# Patient Record
Sex: MALE | Race: WHITE | NOT HISPANIC OR LATINO | Employment: FULL TIME | URBAN - NONMETROPOLITAN AREA
[De-identification: names, ages, dates, MRNs, and addresses within clinical notes are randomized per-mention and may not be internally consistent; named-entity substitution may affect disease eponyms.]

---

## 2017-01-27 ENCOUNTER — TRANSCRIBE ORDERS (OUTPATIENT)
Dept: CT IMAGING | Facility: HOSPITAL | Age: 42
End: 2017-01-27

## 2017-01-27 DIAGNOSIS — K85.90 PANCREATITIS, RECURRENT: Primary | ICD-10-CM

## 2017-01-31 ENCOUNTER — HOSPITAL ENCOUNTER (OUTPATIENT)
Dept: CT IMAGING | Facility: HOSPITAL | Age: 42
Discharge: HOME | End: 2017-01-31
Admitting: FAMILY MEDICINE

## 2017-01-31 DIAGNOSIS — K85.90 PANCREATITIS, RECURRENT: ICD-10-CM

## 2017-01-31 PROCEDURE — 25510000001 DIATRIZOATE MEGLUMINE & SODIUM PER 1 ML: Performed by: FAMILY MEDICINE

## 2017-01-31 PROCEDURE — 74178 CT ABD&PLV WO CNTR FLWD CNTR: CPT

## 2017-01-31 PROCEDURE — 0 IOPAMIDOL 61 % SOLUTION: Performed by: FAMILY MEDICINE

## 2017-01-31 RX ADMIN — DIATRIZOATE MEGLUMINE AND DIATRIZOATE SODIUM 30 ML: 660; 100 LIQUID ORAL; RECTAL at 16:15

## 2017-01-31 RX ADMIN — IOPAMIDOL 98 ML: 612 INJECTION, SOLUTION INTRAVENOUS at 16:15

## 2019-02-11 ENCOUNTER — OFFICE VISIT (OUTPATIENT)
Dept: ORTHOPEDIC SURGERY | Facility: CLINIC | Age: 44
End: 2019-02-11

## 2019-02-11 VITALS — WEIGHT: 265 LBS | BODY MASS INDEX: 35.89 KG/M2 | RESPIRATION RATE: 18 BRPM | HEIGHT: 72 IN

## 2019-02-11 DIAGNOSIS — S93.492A SPRAIN OF ANTERIOR TALOFIBULAR LIGAMENT OF LEFT ANKLE, INITIAL ENCOUNTER: Primary | ICD-10-CM

## 2019-02-11 DIAGNOSIS — M20.11 HALLUX VALGUS OF RIGHT FOOT: ICD-10-CM

## 2019-02-11 DIAGNOSIS — G57.32 ENTRAPMENT NEUROPATHY OF LEFT SUPERFICIAL PERONEAL NERVE: ICD-10-CM

## 2019-02-11 PROCEDURE — 99203 OFFICE O/P NEW LOW 30 MIN: CPT | Performed by: PODIATRIST

## 2019-02-11 RX ORDER — LOSARTAN POTASSIUM AND HYDROCHLOROTHIAZIDE 25; 100 MG/1; MG/1
1 TABLET ORAL DAILY
COMMUNITY

## 2019-02-11 RX ORDER — MELOXICAM 7.5 MG/1
7.5 TABLET ORAL DAILY
Qty: 30 TABLET | Refills: 0 | Status: SHIPPED | OUTPATIENT
Start: 2019-02-11

## 2019-02-11 NOTE — PROGRESS NOTES
Subjective   Patient ID: Vignesh Cheema is a 43 y.o. male   Pain and Numbness of the Left Foot (NKI)  Comes in today complaining of the left foot and ankle pain and stiffness as well as numbness and tingling.  He denies any recent injury states he did play soccer in his youth and had multiple injuries and ankle sprains back in.  Says the pain is intermittent but it's becoming more bothersome and frequent.  Says over the last few weeks it's gotten worse.  He also complains of a bunion on his right foot.    History of Present Illness    Pain Score: 5  Pain Location: Foot  Pain Orientation: Left     Pain Descriptors: Cramping, Sharp, Shooting  Pain Frequency: Intermittent  Pain Onset: Ongoing  Date Pain First Started: 01/11/19  Clinical Progression: Not changed           Pain Intervention(s): Rest, Home medication  Result of Injury: No       Review of Systems   Constitutional: Negative.    Respiratory: Negative.    Cardiovascular: Negative.    Gastrointestinal: Negative.    Musculoskeletal: Positive for arthralgias (left foot) and joint swelling.   Skin: Negative.        Past Medical History:   Diagnosis Date   • Hypertension         Past Surgical History:   Procedure Laterality Date   • ORIF ULNA/RADIUS FRACTURES Right 2000       No Known Allergies      Current Outpatient Medications:   •  losartan-hydrochlorothiazide (HYZAAR) 100-25 MG per tablet, Take 1 tablet by mouth Daily., Disp: , Rfl:   •  meloxicam (MOBIC) 7.5 MG tablet, Take 1 tablet by mouth Daily., Disp: 30 tablet, Rfl: 0    Family History   Problem Relation Age of Onset   • Cancer Mother        Social History     Socioeconomic History   • Marital status:      Spouse name: Not on file   • Number of children: Not on file   • Years of education: Not on file   • Highest education level: Not on file   Social Needs   • Financial resource strain: Not on file   • Food insecurity - worry: Not on file   • Food insecurity - inability: Not on file   •  Transportation needs - medical: Not on file   • Transportation needs - non-medical: Not on file   Occupational History   • Not on file   Tobacco Use   • Smoking status: Never Smoker   • Smokeless tobacco: Never Used   Substance and Sexual Activity   • Alcohol use: Yes     Frequency: Never     Comment: socially   • Drug use: No   • Sexual activity: Defer   Other Topics Concern   • Not on file   Social History Narrative   • Not on file       Counseling given: No       I have reviewed all of the above social hx, family hx, surgical hx, medications, allergies & ROS and confirm that it is accurate.  Objective   Physical Exam   Constitutional: He is oriented to person, place, and time. He appears well-developed and well-nourished.   HENT:   Head: Normocephalic and atraumatic.   Nose: Nose normal.   Eyes: Conjunctivae and EOM are normal. Pupils are equal, round, and reactive to light.   Neck: Normal range of motion.   Cardiovascular: Normal rate.   Pulmonary/Chest: Effort normal.   Neurological: He is alert and oriented to person, place, and time.   Skin: Skin is warm.   Psychiatric: He has a normal mood and affect. His behavior is normal. Judgment and thought content normal.   Nursing note and vitals reviewed.    Left Ankle Exam     Tenderness   The patient is experiencing tenderness in the ATF and CF.   Swelling: moderate    Range of Motion   Dorsiflexion: normal   Plantar flexion: normal   Eversion: normal   Inversion:  15 abnormal     Muscle Strength   The patient has normal left ankle strength.    Tests   Anterior drawer: positive  Varus tilt: positive    Other   Sensation: normal  Pulse: present          Lower extremity exam:  Vascular: Pulses palpable, pedal hair noted, CFT brisk, no edema noted  Neuro: Gross sensation intact.  He complains of paresthesias and numbness and tingling with positive Tinel's over the anterior ankle joint to the lateral 4 digits over the superficial peroneal nerve area.  Negative Tinel's  to the deep peroneal nerve.  Derm: Normal turgor and temperature, no wounds or sores or lesions  MSK: Ankle joint range of motion is slightly decreased with 0° of dorsiflexion.  He's tender in the anterior lateral gutter.  No pain to the peroneal tendons.      Assessment/Plan left anterolateral ankle impingement, left ankle instability, left superficial peroneal nerve entrapment  Independent Review of Radiographic Studies:      Laboratory and Other Studies:     Medical Decision Making:        Procedures  [] No procedures were performed in office today.    Vignesh was seen today for pain and numbness.    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          Recommendations/Plan:  Seems that he most likely has 2 separate areas of pathology occurring.    the first which seems to be a probable chronic lateral ankle instability and anterolateral impingement.  I discussed this with him and he wears very high top sturdy lace up leather boots probably 12 hours a day so I think that is sufficient rather than giving him a brace.  I want him to perform home exercises which should hopefully help both areas and if this doesn't help may consider formal physical therapy.  I discussed the nerve pathology which seems to be separate as well and will follow Along with oral and compounded medications this may help both areas.  If this doesn't seem to help we may consider injection upon next visit and bracing and physical therapy.  Briefly discussed the bunion on the right foot and while I did not examine this discussed we could take x-rays and further discuss this when it becomes more problematic.  I'll see him back in about 3-4 weeks for the left ankle.  Is given handouts on the left ankle instability.    Return in about  4 weeks (around 3/11/2019) for xoa left foot and ankle, right foot.  Patient agreeable to call or return sooner for any concerns.

## 2019-03-06 DIAGNOSIS — M25.572 ARTHRALGIA OF LEFT FOOT: Primary | ICD-10-CM

## 2019-03-07 ENCOUNTER — OFFICE VISIT (OUTPATIENT)
Dept: ORTHOPEDIC SURGERY | Facility: CLINIC | Age: 44
End: 2019-03-07

## 2019-03-07 VITALS — BODY MASS INDEX: 36.57 KG/M2 | HEIGHT: 72 IN | RESPIRATION RATE: 18 BRPM | WEIGHT: 270 LBS

## 2019-03-07 DIAGNOSIS — G57.32 ENTRAPMENT NEUROPATHY OF LEFT SUPERFICIAL PERONEAL NERVE: ICD-10-CM

## 2019-03-07 DIAGNOSIS — S93.492A SPRAIN OF ANTERIOR TALOFIBULAR LIGAMENT OF LEFT ANKLE, INITIAL ENCOUNTER: ICD-10-CM

## 2019-03-07 DIAGNOSIS — M25.572 ARTHRALGIA OF LEFT FOOT: ICD-10-CM

## 2019-03-07 DIAGNOSIS — M20.11 HALLUX VALGUS OF RIGHT FOOT: Primary | ICD-10-CM

## 2019-03-07 PROCEDURE — 99213 OFFICE O/P EST LOW 20 MIN: CPT | Performed by: PODIATRIST

## 2019-03-07 NOTE — PROGRESS NOTES
Subjective   Patient ID: Vignesh Cheema is a 44 y.o. male   Follow-up and Pain of the Left Foot  Patient returns back today for his left foot and ankle pain as well as his right bunion.  He says his tingling is gotten less.  He thinks his pain is decreased some as well.  He was on vacation in [place] last week and was doing a whole lot of walking without support and says by the end of the day it would bother him some but overall he has seen improvement.    History of Present Illness    Pain Score: 3  Pain Location: Foot  Pain Orientation: Left     Pain Descriptors: Aching  Pain Frequency: Intermittent  Pain Onset: Ongoing     Clinical Progression: Gradually improving  Aggravating Factors: Walking, Standing        Pain Intervention(s): Rest, Home medication          Review of Systems   Constitutional: Negative for diaphoresis, fever and unexpected weight change.   HENT: Negative for dental problem and sore throat.    Eyes: Negative for visual disturbance.   Respiratory: Negative for shortness of breath.    Cardiovascular: Negative for chest pain.   Gastrointestinal: Negative for abdominal pain, constipation, diarrhea, nausea and vomiting.   Genitourinary: Negative for difficulty urinating and frequency.   Neurological: Negative for headaches.   Hematological: Does not bruise/bleed easily.       Past Medical History:   Diagnosis Date   • Hypertension         Past Surgical History:   Procedure Laterality Date   • ORIF ULNA/RADIUS FRACTURES Right 2000       No Known Allergies      Current Outpatient Medications:   •  losartan-hydrochlorothiazide (HYZAAR) 100-25 MG per tablet, Take 1 tablet by mouth Daily., Disp: , Rfl:   •  meloxicam (MOBIC) 7.5 MG tablet, Take 1 tablet by mouth Daily., Disp: 30 tablet, Rfl: 0    Family History   Problem Relation Age of Onset   • Cancer Mother        Social History     Socioeconomic History   • Marital status:      Spouse name: Not on file   • Number of children: Not on file    • Years of education: Not on file   • Highest education level: Not on file   Social Needs   • Financial resource strain: Not on file   • Food insecurity - worry: Not on file   • Food insecurity - inability: Not on file   • Transportation needs - medical: Not on file   • Transportation needs - non-medical: Not on file   Occupational History   • Not on file   Tobacco Use   • Smoking status: Never Smoker   • Smokeless tobacco: Never Used   Substance and Sexual Activity   • Alcohol use: Yes     Frequency: Never     Comment: socially   • Drug use: No   • Sexual activity: Defer   Other Topics Concern   • Not on file   Social History Narrative   • Not on file       Counseling given: No       I have reviewed all of the above social hx, family hx, surgical hx, medications, allergies & ROS and confirm that it is accurate.  Objective   Physical Exam   Constitutional: He is oriented to person, place, and time. He appears well-developed and well-nourished.   HENT:   Head: Normocephalic and atraumatic.   Nose: Nose normal.   Eyes: Conjunctivae and EOM are normal. Pupils are equal, round, and reactive to light.   Neck: Normal range of motion.   Cardiovascular: Normal rate.   Pulmonary/Chest: Effort normal.   Neurological: He is alert and oriented to person, place, and time.   Skin: Skin is warm.   Psychiatric: He has a normal mood and affect. His behavior is normal. Judgment and thought content normal.   Nursing note and vitals reviewed.    Ortho Exam  Left Ankle Exam      Tenderness   The patient is experiencing tenderness in the ATF and CF.   Swelling: moderate     Range of Motion   Dorsiflexion: normal   Plantar flexion: normal   Eversion: normal   Inversion:  15 abnormal      Muscle Strength   The patient has normal left ankle strength.     Tests   Anterior drawer: positive  Varus tilt: positive     Other   Sensation: normal  Pulse: present              Lower extremity exam:  Vascular: Pulses palpable, pedal hair noted, CFT  brisk, no edema noted  Neuro: Gross sensation intact.  He complains of paresthesias and numbness and tingling with positive Tinel's over the anterior ankle joint to the lateral 4 digits over the superficial peroneal nerve area.  Negative Tinel's to the deep peroneal nerve.  Derm: Normal turgor and temperature, no wounds or sores or lesions  MSK: Ankle joint range of motion is slightly decreased with 0° of dorsiflexion.  He's tender in the anterior lateral gutter.  No pain to the peroneal tendons.       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    There are no diagnoses linked to this encounter.      Recommendations/Plan:  He had x-rays taken of the left foot but not the right foot so it still difficult for me to evaluate the bunion deformity on the right side.  We briefly discussed the left side and surgery but coming up on the spring and summer he does not wish to have anything done with this.  I will have him continue his medicines and give him a home rehab program to do for the ankle.  Recommend he continue with oral and topical medicines.  I will give him a lace up ankle brace to use when he is not wearing his lace up boots and at work.  If the pain worsens or changes I would like him to let me know.  I will follow-up on him in about 4-6 weeks.    No Follow-up on file.  Patient agreeable to call or return sooner for any concerns.